# Patient Record
Sex: MALE | Race: WHITE | Employment: OTHER | URBAN - METROPOLITAN AREA
[De-identification: names, ages, dates, MRNs, and addresses within clinical notes are randomized per-mention and may not be internally consistent; named-entity substitution may affect disease eponyms.]

---

## 2021-07-12 ENCOUNTER — HOSPITAL ENCOUNTER (OUTPATIENT)
Age: 81
Setting detail: OBSERVATION
Discharge: HOME | End: 2021-07-14
Attending: EMERGENCY MEDICINE | Admitting: INTERNAL MEDICINE
Payer: MEDICARE

## 2021-07-12 ENCOUNTER — APPOINTMENT (OUTPATIENT)
Dept: GENERAL RADIOLOGY | Age: 81
End: 2021-07-12
Payer: MEDICARE

## 2021-07-12 ENCOUNTER — APPOINTMENT (OUTPATIENT)
Dept: CT IMAGING | Age: 81
End: 2021-07-12
Payer: MEDICARE

## 2021-07-12 DIAGNOSIS — H34.9 RETINAL ARTERY OCCLUSION: Primary | ICD-10-CM

## 2021-07-12 PROBLEM — I63.9 CVA (CEREBRAL VASCULAR ACCIDENT) (HCC): Status: ACTIVE | Noted: 2021-07-12

## 2021-07-12 LAB
ANION GAP SERPL CALCULATED.3IONS-SCNC: 10 MMOL/L (ref 7–16)
APTT: 30 SEC (ref 24.5–35.1)
BASOPHILS ABSOLUTE: 0.06 E9/L (ref 0–0.2)
BASOPHILS RELATIVE PERCENT: 0.8 % (ref 0–2)
BUN BLDV-MCNC: 14 MG/DL (ref 6–23)
CALCIUM SERPL-MCNC: 8.9 MG/DL (ref 8.6–10.2)
CHLORIDE BLD-SCNC: 103 MMOL/L (ref 98–107)
CO2: 25 MMOL/L (ref 22–29)
CREAT SERPL-MCNC: 0.9 MG/DL (ref 0.7–1.2)
EOSINOPHILS ABSOLUTE: 0.38 E9/L (ref 0.05–0.5)
EOSINOPHILS RELATIVE PERCENT: 4.8 % (ref 0–6)
GFR AFRICAN AMERICAN: >60
GFR NON-AFRICAN AMERICAN: >60 ML/MIN/1.73
GLUCOSE BLD-MCNC: 100 MG/DL (ref 74–99)
HCT VFR BLD CALC: 42.4 % (ref 37–54)
HEMOGLOBIN: 14.6 G/DL (ref 12.5–16.5)
IMMATURE GRANULOCYTES #: 0.04 E9/L
IMMATURE GRANULOCYTES %: 0.5 % (ref 0–5)
INR BLD: 1
LYMPHOCYTES ABSOLUTE: 2.06 E9/L (ref 1.5–4)
LYMPHOCYTES RELATIVE PERCENT: 26 % (ref 20–42)
MCH RBC QN AUTO: 33.9 PG (ref 26–35)
MCHC RBC AUTO-ENTMCNC: 34.4 % (ref 32–34.5)
MCV RBC AUTO: 98.4 FL (ref 80–99.9)
MONOCYTES ABSOLUTE: 0.95 E9/L (ref 0.1–0.95)
MONOCYTES RELATIVE PERCENT: 12 % (ref 2–12)
NEUTROPHILS ABSOLUTE: 4.42 E9/L (ref 1.8–7.3)
NEUTROPHILS RELATIVE PERCENT: 55.9 % (ref 43–80)
PDW BLD-RTO: 12.9 FL (ref 11.5–15)
PLATELET # BLD: 234 E9/L (ref 130–450)
PMV BLD AUTO: 9.5 FL (ref 7–12)
POTASSIUM SERPL-SCNC: 4.3 MMOL/L (ref 3.5–5)
PROTHROMBIN TIME: 11.3 SEC (ref 9.3–12.4)
RBC # BLD: 4.31 E12/L (ref 3.8–5.8)
SODIUM BLD-SCNC: 138 MMOL/L (ref 132–146)
TROPONIN, HIGH SENSITIVITY: 10 NG/L (ref 0–11)
TROPONIN, HIGH SENSITIVITY: 9 NG/L (ref 0–11)
WBC # BLD: 7.9 E9/L (ref 4.5–11.5)

## 2021-07-12 PROCEDURE — 85610 PROTHROMBIN TIME: CPT

## 2021-07-12 PROCEDURE — 70450 CT HEAD/BRAIN W/O DYE: CPT

## 2021-07-12 PROCEDURE — 70498 CT ANGIOGRAPHY NECK: CPT

## 2021-07-12 PROCEDURE — 84484 ASSAY OF TROPONIN QUANT: CPT

## 2021-07-12 PROCEDURE — 70496 CT ANGIOGRAPHY HEAD: CPT

## 2021-07-12 PROCEDURE — 71046 X-RAY EXAM CHEST 2 VIEWS: CPT

## 2021-07-12 PROCEDURE — 36415 COLL VENOUS BLD VENIPUNCTURE: CPT

## 2021-07-12 PROCEDURE — G0378 HOSPITAL OBSERVATION PER HR: HCPCS

## 2021-07-12 PROCEDURE — 93005 ELECTROCARDIOGRAM TRACING: CPT | Performed by: NURSE PRACTITIONER

## 2021-07-12 PROCEDURE — 6370000000 HC RX 637 (ALT 250 FOR IP): Performed by: FAMILY MEDICINE

## 2021-07-12 PROCEDURE — 80048 BASIC METABOLIC PNL TOTAL CA: CPT

## 2021-07-12 PROCEDURE — 6360000004 HC RX CONTRAST MEDICATION: Performed by: RADIOLOGY

## 2021-07-12 PROCEDURE — 99283 EMERGENCY DEPT VISIT LOW MDM: CPT

## 2021-07-12 PROCEDURE — 85730 THROMBOPLASTIN TIME PARTIAL: CPT

## 2021-07-12 PROCEDURE — 2580000003 HC RX 258: Performed by: FAMILY MEDICINE

## 2021-07-12 PROCEDURE — 85025 COMPLETE CBC W/AUTO DIFF WBC: CPT

## 2021-07-12 RX ORDER — TROSPIUM CHLORIDE 20 MG/1
20 TABLET, FILM COATED ORAL
Status: DISCONTINUED | OUTPATIENT
Start: 2021-07-13 | End: 2021-07-14 | Stop reason: HOSPADM

## 2021-07-12 RX ORDER — ASPIRIN 81 MG/1
81 TABLET ORAL DAILY
Status: DISCONTINUED | OUTPATIENT
Start: 2021-07-13 | End: 2021-07-14 | Stop reason: HOSPADM

## 2021-07-12 RX ORDER — DOXAZOSIN MESYLATE 4 MG/1
4 TABLET ORAL NIGHTLY
COMMUNITY

## 2021-07-12 RX ORDER — SODIUM CHLORIDE 0.9 % (FLUSH) 0.9 %
5-40 SYRINGE (ML) INJECTION EVERY 12 HOURS SCHEDULED
Status: DISCONTINUED | OUTPATIENT
Start: 2021-07-12 | End: 2021-07-14 | Stop reason: HOSPADM

## 2021-07-12 RX ORDER — TOLTERODINE 4 MG/1
4 CAPSULE, EXTENDED RELEASE ORAL DAILY
COMMUNITY

## 2021-07-12 RX ORDER — ATORVASTATIN CALCIUM 40 MG/1
40 TABLET, FILM COATED ORAL NIGHTLY
Status: DISCONTINUED | OUTPATIENT
Start: 2021-07-12 | End: 2021-07-12 | Stop reason: CLARIF

## 2021-07-12 RX ORDER — DOXAZOSIN MESYLATE 4 MG/1
4 TABLET ORAL NIGHTLY
Status: DISCONTINUED | OUTPATIENT
Start: 2021-07-12 | End: 2021-07-14 | Stop reason: HOSPADM

## 2021-07-12 RX ORDER — LOSARTAN POTASSIUM 50 MG/1
50 TABLET ORAL DAILY
Status: DISCONTINUED | OUTPATIENT
Start: 2021-07-13 | End: 2021-07-14 | Stop reason: HOSPADM

## 2021-07-12 RX ORDER — LOSARTAN POTASSIUM 50 MG/1
50 TABLET ORAL DAILY
COMMUNITY

## 2021-07-12 RX ORDER — ROSUVASTATIN CALCIUM 10 MG/1
5 TABLET, COATED ORAL DAILY
Status: DISCONTINUED | OUTPATIENT
Start: 2021-07-13 | End: 2021-07-14 | Stop reason: HOSPADM

## 2021-07-12 RX ORDER — ONDANSETRON 2 MG/ML
4 INJECTION INTRAMUSCULAR; INTRAVENOUS EVERY 6 HOURS PRN
Status: DISCONTINUED | OUTPATIENT
Start: 2021-07-12 | End: 2021-07-14 | Stop reason: HOSPADM

## 2021-07-12 RX ORDER — SODIUM CHLORIDE 0.9 % (FLUSH) 0.9 %
5-40 SYRINGE (ML) INJECTION PRN
Status: DISCONTINUED | OUTPATIENT
Start: 2021-07-12 | End: 2021-07-14 | Stop reason: HOSPADM

## 2021-07-12 RX ORDER — POLYETHYLENE GLYCOL 3350 17 G/17G
17 POWDER, FOR SOLUTION ORAL DAILY PRN
Status: DISCONTINUED | OUTPATIENT
Start: 2021-07-12 | End: 2021-07-14 | Stop reason: HOSPADM

## 2021-07-12 RX ORDER — ALLOPURINOL 300 MG/1
300 TABLET ORAL DAILY
COMMUNITY

## 2021-07-12 RX ORDER — ALLOPURINOL 300 MG/1
300 TABLET ORAL DAILY
Status: DISCONTINUED | OUTPATIENT
Start: 2021-07-13 | End: 2021-07-14 | Stop reason: HOSPADM

## 2021-07-12 RX ORDER — ROSUVASTATIN CALCIUM 5 MG/1
5 TABLET, COATED ORAL DAILY
COMMUNITY

## 2021-07-12 RX ORDER — SODIUM CHLORIDE 9 MG/ML
25 INJECTION, SOLUTION INTRAVENOUS PRN
Status: DISCONTINUED | OUTPATIENT
Start: 2021-07-12 | End: 2021-07-14 | Stop reason: HOSPADM

## 2021-07-12 RX ORDER — ASPIRIN 300 MG/1
300 SUPPOSITORY RECTAL DAILY
Status: DISCONTINUED | OUTPATIENT
Start: 2021-07-13 | End: 2021-07-14 | Stop reason: HOSPADM

## 2021-07-12 RX ORDER — ONDANSETRON 4 MG/1
4 TABLET, ORALLY DISINTEGRATING ORAL EVERY 8 HOURS PRN
Status: DISCONTINUED | OUTPATIENT
Start: 2021-07-12 | End: 2021-07-14 | Stop reason: HOSPADM

## 2021-07-12 RX ADMIN — DOXAZOSIN 4 MG: 4 TABLET ORAL at 23:28

## 2021-07-12 RX ADMIN — SODIUM CHLORIDE, PRESERVATIVE FREE 10 ML: 5 INJECTION INTRAVENOUS at 23:28

## 2021-07-12 RX ADMIN — IOPAMIDOL 75 ML: 755 INJECTION, SOLUTION INTRAVENOUS at 17:50

## 2021-07-12 ASSESSMENT — ENCOUNTER SYMPTOMS
SORE THROAT: 0
BACK PAIN: 0
SHORTNESS OF BREATH: 0
NAUSEA: 0
EYE PAIN: 0
EYE DISCHARGE: 0
EYE REDNESS: 0
ABDOMINAL PAIN: 0

## 2021-07-12 NOTE — ED NOTES
Visual acuity with patients glasses, both eyes 20/70                                                           Right eye 20/100                                                           Left eye 20/100      Santino Nova LPN  29/02/48 [id number]

## 2021-07-12 NOTE — ED PROVIDER NOTES
80-year-old male with history of hypertension and hyperlipidemia presenting for eye problem. He states he was sent here by Dr. Matthew Suarez after being seen in his office today for blood clot behind his right eye. He states on Saturday evening, he noticed his vision went blurry in his right eye. It has been persistent, worsened by nothing, and relieved by nothing. He denies any pain in his eyes, headache, dizziness, or lightheadedness. He does state he was not feeling well overall on Saturday afternoon, but states by evening he was feeling better. Review of Systems   Constitutional: Negative for chills and fever. HENT: Negative for congestion and sore throat. Eyes: Positive for visual disturbance. Negative for pain, discharge and redness. Respiratory: Negative for shortness of breath. Cardiovascular: Negative for chest pain. Gastrointestinal: Negative for abdominal pain and nausea. Genitourinary: Negative for dysuria and flank pain. Musculoskeletal: Negative for back pain and neck pain. Skin: Negative for rash and wound. Neurological: Negative for dizziness, light-headedness and headaches. Physical Exam  Constitutional:       General: He is not in acute distress. Appearance: He is not ill-appearing or toxic-appearing. HENT:      Head: Normocephalic and atraumatic. Right Ear: External ear normal.      Left Ear: External ear normal.      Nose: Nose normal.   Eyes:      Extraocular Movements: Extraocular movements intact. Conjunctiva/sclera: Conjunctivae normal.      Pupils: Pupils are equal, round, and reactive to light. Comments: Pupils dilated, but equally reactive to light   Cardiovascular:      Rate and Rhythm: Normal rate and regular rhythm. Pulmonary:      Effort: Pulmonary effort is normal.      Breath sounds: Normal breath sounds. Abdominal:      General: There is no distension. Palpations: Abdomen is soft.    Musculoskeletal:         General: No swelling or tenderness. Cervical back: Normal range of motion. Skin:     General: Skin is warm and dry. Neurological:      General: No focal deficit present. Mental Status: He is alert. Sensory: No sensory deficit. Psychiatric:         Mood and Affect: Mood normal.         Thought Content: Thought content normal.          Procedures     MDM  Number of Diagnoses or Management Options  Retinal artery occlusion  Diagnosis management comments: 81-year-old male with a history of hypertension hyperlipidemia sent by eye doctor for retinal artery occlusion. EKG showed first-degree AV block, but was otherwise unremarkable. CTA head and neck did not show any acute process. Chest x-ray showed cardiomegaly, but otherwise unremarkable. Troponin 9, repeat pending. Labs otherwise within normal limits. Patient was admitted for further evaluation.                    --------------------------------------------- PAST HISTORY ---------------------------------------------  Past Medical History:  has a past medical history of Enlarged prostate, Hyperlipidemia, and Hypertension. Past Surgical History:  has a past surgical history that includes hernia repair and Appendectomy. Social History:  reports that he has quit smoking. He has never used smokeless tobacco. He reports previous alcohol use. He reports previous drug use. Family History: family history is not on file. The patients home medications have been reviewed. Allergies: Patient has no known allergies.     -------------------------------------------------- RESULTS -------------------------------------------------    LABS:  Results for orders placed or performed during the hospital encounter of 07/12/21   CBC Auto Differential   Result Value Ref Range    WBC 7.9 4.5 - 11.5 E9/L    RBC 4.31 3.80 - 5.80 E12/L    Hemoglobin 14.6 12.5 - 16.5 g/dL    Hematocrit 42.4 37.0 - 54.0 %    MCV 98.4 80.0 - 99.9 fL    MCH 33.9 26.0 - 35.0 pg    MCHC 34.4 32.0 - 34.5 %    RDW 12.9 11.5 - 15.0 fL    Platelets 474 549 - 080 E9/L    MPV 9.5 7.0 - 12.0 fL    Neutrophils % 55.9 43.0 - 80.0 %    Immature Granulocytes % 0.5 0.0 - 5.0 %    Lymphocytes % 26.0 20.0 - 42.0 %    Monocytes % 12.0 2.0 - 12.0 %    Eosinophils % 4.8 0.0 - 6.0 %    Basophils % 0.8 0.0 - 2.0 %    Neutrophils Absolute 4.42 1.80 - 7.30 E9/L    Immature Granulocytes # 0.04 E9/L    Lymphocytes Absolute 2.06 1.50 - 4.00 E9/L    Monocytes Absolute 0.95 0.10 - 0.95 E9/L    Eosinophils Absolute 0.38 0.05 - 0.50 E9/L    Basophils Absolute 0.06 0.00 - 0.20 E9/L   Troponin   Result Value Ref Range    Troponin, High Sensitivity 9 0 - 11 ng/L   Protime-INR   Result Value Ref Range    Protime 11.3 9.3 - 12.4 sec    INR 1.0    APTT   Result Value Ref Range    aPTT 30.0 24.5 - 35.1 sec   Basic Metabolic Panel   Result Value Ref Range    Sodium 138 132 - 146 mmol/L    Potassium 4.3 3.5 - 5.0 mmol/L    Chloride 103 98 - 107 mmol/L    CO2 25 22 - 29 mmol/L    Anion Gap 10 7 - 16 mmol/L    Glucose 100 (H) 74 - 99 mg/dL    BUN 14 6 - 23 mg/dL    CREATININE 0.9 0.7 - 1.2 mg/dL    GFR Non-African American >60 >=60 mL/min/1.73    GFR African American >60     Calcium 8.9 8.6 - 10.2 mg/dL   EKG 12 Lead   Result Value Ref Range    Ventricular Rate 102 BPM    Atrial Rate 102 BPM    P-R Interval 230 ms    QRS Duration 90 ms    Q-T Interval 318 ms    QTc Calculation (Bazett) 414 ms    P Axis 28 degrees    R Axis -23 degrees    T Axis 12 degrees       RADIOLOGY:  CT HEAD WO CONTRAST   Final Result   No acute intracranial abnormality. CTA HEAD W CONTRAST   Final Result   1. No significant stenosis or occlusion of the cervical vasculature. 2. No large vessel occlusion intracranially. 3. Heterogeneous thyroid with multiple nodules measuring up to 1.8 cm. RECOMMENDATIONS:   1.8 cm incidental thyroid nodule. Recommend thyroid US. Reference: J Am Tanvir Radiol.  2015 Feb;12(2): 143-50         CTA NECK W CONTRAST Final Result   1. No significant stenosis or occlusion of the cervical vasculature. 2. No large vessel occlusion intracranially. 3. Heterogeneous thyroid with multiple nodules measuring up to 1.8 cm. RECOMMENDATIONS:   1.8 cm incidental thyroid nodule. Recommend thyroid US. Reference: J Am Tanvir Radiol. 2015 Feb;12(2): 143-50         XR CHEST (2 VW)   Final Result   1. There are no findings of failure or pneumonia   2. Cardiomegaly. EKG:  This EKG is signed and interpreted by me. Rate: 102  Rhythm: Sinus  Interpretation: sinus tachycardia and 1st degree AV block  Comparison: no previous EKG available      ------------------------- NURSING NOTES AND VITALS REVIEWED ---------------------------  Date / Time Roomed:  7/12/2021  3:14 PM  ED Bed Assignment:  36/36    The nursing notes within the ED encounter and vital signs as below have been reviewed. Patient Vitals for the past 24 hrs:   BP Temp Pulse Resp SpO2 Height Weight   07/12/21 1433 (!) 144/104   18  5' 11\" (1.803 m) 225 lb (102.1 kg)   07/12/21 1422  97.5 °F (36.4 °C) 110  96 %         Oxygen Saturation Interpretation: Normal    ------------------------------------------ PROGRESS NOTES ------------------------------------------      Counseling:  I have spoken with the patient and discussed todays results, in addition to providing specific details for the plan of care and counseling regarding the diagnosis and prognosis. Their questions are answered at this time and they are agreeable with the plan of admission.    --------------------------------- ADDITIONAL PROVIDER NOTES ---------------------------------    This patient's ED course included: a personal history and physicial examination, re-evaluation prior to disposition, multiple bedside re-evaluations, cardiac monitoring and continuous pulse oximetry    This patient has remained hemodynamically stable during their ED course. Diagnosis:  1.  Retinal artery occlusion Disposition:  Patient's disposition: Admit to telemetry  Patient's condition is stable.          Kianna Gooden MD  Resident  07/12/21 6157

## 2021-07-12 NOTE — ED NOTES
Bed: 36  Expected date:   Expected time:   Means of arrival:   Comments:  Chyna York RN  07/12/21 7503

## 2021-07-12 NOTE — ED NOTES
FIRST PROVIDER CONTACT ASSESSMENT NOTE        Department of Emergency Medicine            ED  First Provider Note            7/12/21  2:38 PM EDT    Chief Complaint: Eye Problem (sent in by Dr. Florentino Jacobs for eye problems)      History of Present Illness:    Sarita Valdez is a [de-identified] y.o. male who presents to the emergency department for retinal artery occlusion  Focused Screening Exam:  Constitutional:  Alert, appears stated age and is in no distress. *ALLERGIES*     Patient has no known allergies.      ED Triage Vitals   BP Temp Temp src Pulse Resp SpO2 Height Weight   07/12/21 1433 07/12/21 1422 -- 07/12/21 1422 07/12/21 1433 07/12/21 1422 07/12/21 1433 07/12/21 1433   (!) 144/104 97.5 °F (36.4 °C)  110 18 96 % 5' 11\" (1.803 m) 225 lb (102.1 kg)        Initial Plan of Care:  Initiate Treatment-Testing, Proceed toTreatment Area When Bed Available for ED Attending/MLP to Continue Care    -----------------END OF FIRST PROVIDER CONTACT ASSESSMENT NOTE--------------  Electronically signed by FAITH Lincoln CNP   DD: 7/12/21       FAITH Lincoln CNP  07/12/21 1430

## 2021-07-13 ENCOUNTER — APPOINTMENT (OUTPATIENT)
Dept: MRI IMAGING | Age: 81
End: 2021-07-13
Payer: MEDICARE

## 2021-07-13 LAB
ANION GAP SERPL CALCULATED.3IONS-SCNC: 7 MMOL/L (ref 7–16)
BUN BLDV-MCNC: 15 MG/DL (ref 6–23)
C-REACTIVE PROTEIN: 0.3 MG/DL (ref 0–0.4)
CALCIUM SERPL-MCNC: 8.7 MG/DL (ref 8.6–10.2)
CHLORIDE BLD-SCNC: 103 MMOL/L (ref 98–107)
CHOLESTEROL, TOTAL: 122 MG/DL (ref 0–199)
CO2: 27 MMOL/L (ref 22–29)
CREAT SERPL-MCNC: 0.9 MG/DL (ref 0.7–1.2)
EKG ATRIAL RATE: 102 BPM
EKG P AXIS: 28 DEGREES
EKG P-R INTERVAL: 230 MS
EKG Q-T INTERVAL: 318 MS
EKG QRS DURATION: 90 MS
EKG QTC CALCULATION (BAZETT): 414 MS
EKG R AXIS: -23 DEGREES
EKG T AXIS: 12 DEGREES
EKG VENTRICULAR RATE: 102 BPM
GFR AFRICAN AMERICAN: >60
GFR NON-AFRICAN AMERICAN: >60 ML/MIN/1.73
GLUCOSE BLD-MCNC: 94 MG/DL (ref 74–99)
HBA1C MFR BLD: 5.2 % (ref 4–5.6)
HCT VFR BLD CALC: 40.1 % (ref 37–54)
HDLC SERPL-MCNC: 46 MG/DL
HEMOGLOBIN: 13.9 G/DL (ref 12.5–16.5)
LDL CHOLESTEROL CALCULATED: 60 MG/DL (ref 0–99)
MCH RBC QN AUTO: 33.8 PG (ref 26–35)
MCHC RBC AUTO-ENTMCNC: 34.7 % (ref 32–34.5)
MCV RBC AUTO: 97.6 FL (ref 80–99.9)
PDW BLD-RTO: 12.8 FL (ref 11.5–15)
PLATELET # BLD: 228 E9/L (ref 130–450)
PMV BLD AUTO: 9.2 FL (ref 7–12)
POTASSIUM REFLEX MAGNESIUM: 4.1 MMOL/L (ref 3.5–5)
RBC # BLD: 4.11 E12/L (ref 3.8–5.8)
SEDIMENTATION RATE, ERYTHROCYTE: 5 MM/HR (ref 0–15)
SODIUM BLD-SCNC: 137 MMOL/L (ref 132–146)
TRIGL SERPL-MCNC: 80 MG/DL (ref 0–149)
VLDLC SERPL CALC-MCNC: 16 MG/DL
WBC # BLD: 6.6 E9/L (ref 4.5–11.5)

## 2021-07-13 PROCEDURE — 93010 ELECTROCARDIOGRAM REPORT: CPT | Performed by: INTERNAL MEDICINE

## 2021-07-13 PROCEDURE — 86140 C-REACTIVE PROTEIN: CPT

## 2021-07-13 PROCEDURE — 83036 HEMOGLOBIN GLYCOSYLATED A1C: CPT

## 2021-07-13 PROCEDURE — 85651 RBC SED RATE NONAUTOMATED: CPT

## 2021-07-13 PROCEDURE — 6370000000 HC RX 637 (ALT 250 FOR IP): Performed by: FAMILY MEDICINE

## 2021-07-13 PROCEDURE — 70551 MRI BRAIN STEM W/O DYE: CPT

## 2021-07-13 PROCEDURE — 6370000000 HC RX 637 (ALT 250 FOR IP): Performed by: NURSE PRACTITIONER

## 2021-07-13 PROCEDURE — 6370000000 HC RX 637 (ALT 250 FOR IP): Performed by: INTERNAL MEDICINE

## 2021-07-13 PROCEDURE — 85027 COMPLETE CBC AUTOMATED: CPT

## 2021-07-13 PROCEDURE — 6360000002 HC RX W HCPCS: Performed by: FAMILY MEDICINE

## 2021-07-13 PROCEDURE — 2580000003 HC RX 258: Performed by: FAMILY MEDICINE

## 2021-07-13 PROCEDURE — 96372 THER/PROPH/DIAG INJ SC/IM: CPT

## 2021-07-13 PROCEDURE — 36415 COLL VENOUS BLD VENIPUNCTURE: CPT

## 2021-07-13 PROCEDURE — 80048 BASIC METABOLIC PNL TOTAL CA: CPT

## 2021-07-13 PROCEDURE — G0378 HOSPITAL OBSERVATION PER HR: HCPCS

## 2021-07-13 PROCEDURE — 80061 LIPID PANEL: CPT

## 2021-07-13 PROCEDURE — 99221 1ST HOSP IP/OBS SF/LOW 40: CPT | Performed by: PSYCHIATRY & NEUROLOGY

## 2021-07-13 RX ORDER — LORAZEPAM 0.5 MG/1
0.5 TABLET ORAL
Status: COMPLETED | OUTPATIENT
Start: 2021-07-13 | End: 2021-07-13

## 2021-07-13 RX ORDER — ACETAMINOPHEN 325 MG/1
650 TABLET ORAL ONCE
Status: COMPLETED | OUTPATIENT
Start: 2021-07-13 | End: 2021-07-13

## 2021-07-13 RX ADMIN — ENOXAPARIN SODIUM 40 MG: 40 INJECTION SUBCUTANEOUS at 08:26

## 2021-07-13 RX ADMIN — ALLOPURINOL 300 MG: 300 TABLET ORAL at 08:26

## 2021-07-13 RX ADMIN — ACETAMINOPHEN 650 MG: 325 TABLET ORAL at 00:20

## 2021-07-13 RX ADMIN — ASPIRIN 81 MG: 81 TABLET, COATED ORAL at 08:26

## 2021-07-13 RX ADMIN — LOSARTAN POTASSIUM 50 MG: 50 TABLET, FILM COATED ORAL at 08:26

## 2021-07-13 RX ADMIN — LORAZEPAM 0.5 MG: 0.5 TABLET ORAL at 16:42

## 2021-07-13 RX ADMIN — SODIUM CHLORIDE, PRESERVATIVE FREE 10 ML: 5 INJECTION INTRAVENOUS at 08:28

## 2021-07-13 RX ADMIN — DOXAZOSIN 4 MG: 4 TABLET ORAL at 20:30

## 2021-07-13 RX ADMIN — ROSUVASTATIN 5 MG: 10 TABLET, FILM COATED ORAL at 08:26

## 2021-07-13 RX ADMIN — TROSPIUM CHLORIDE 20 MG: 20 TABLET, FILM COATED ORAL at 06:50

## 2021-07-13 RX ADMIN — SODIUM CHLORIDE, PRESERVATIVE FREE 10 ML: 5 INJECTION INTRAVENOUS at 20:30

## 2021-07-13 RX ADMIN — TROSPIUM CHLORIDE 20 MG: 20 TABLET, FILM COATED ORAL at 15:47

## 2021-07-13 ASSESSMENT — VISUAL ACUITY: METHOD_CF: 1

## 2021-07-13 ASSESSMENT — ENCOUNTER SYMPTOMS
SHORTNESS OF BREATH: 0
EYE ITCHING: 0
FACIAL SWELLING: 0
ABDOMINAL PAIN: 0
ABDOMINAL DISTENTION: 0
EYE REDNESS: 0
SORE THROAT: 0
SINUS PAIN: 0
COLOR CHANGE: 0
EYE DISCHARGE: 0
CHEST TIGHTNESS: 0
EYE PAIN: 0

## 2021-07-13 ASSESSMENT — PAIN SCALES - GENERAL
PAINLEVEL_OUTOF10: 3
PAINLEVEL_OUTOF10: 0

## 2021-07-13 NOTE — PROGRESS NOTES
Date:2021  Patient Name: Emeterio Whittington  MRN: [id number]  : 1940  ROOM #: [id number]    Occupational Therapy Screen    OT orders received and chart reviewed. OT screen completed as pt politely declines need for skilled therapy. Pt states no concerns for skilled OT services at this time during admission or upon return to home. OT observed Pt independent in room/bathroom. OT will discontinue orders at this time. Please re-order OT if there is a change in physical status and OT is needed.      Thank you,    Stacie Del Valle, [address] OTR/L #[id number]

## 2021-07-13 NOTE — CONSULTS
Rannie Krabbe is a [de-identified] y.o. male with CC of vision loss R eye. Chief Complaint   Patient presents with   [facility] Problem     sent in by Dr. Ora Dover for eye problems - retinal artery occlusion       HPI: Pt was doing well until Saturday evening when he noticed he had a blurry spot in his right eye. He is unsure if this happened over minutes or hours. He describes this being mainly in the superior visual field with some wrap around to the bottom of the eye, sparing the central portion of his vision. His retinal specialist performed a dilated fundoscopic exam yesterday and told the patient his retinal artery was blocked. The patient has chronic macular degeneration in the left eye but notes no acute change in vision in the left eye. Pt denies weakness, numbness, facial droop, jaw soreness or scalp tenderness, new/change in headache (history of tension headaches, which he was having on Saturday). Denies fever, chills. He is planning on moving to [place] in October and feels fatigued from the prep work around the house. HPI  Prior to Visit Medications    Medication Sig Taking? Authorizing Provider   tolterodine (DETROL LA) 4 MG extended release capsule Take 4 mg by mouth daily Yes Historical Provider, MD   rosuvastatin (CRESTOR) 5 MG tablet Take 5 mg by mouth daily Yes Historical Provider, MD   losartan (COZAAR) 50 MG tablet Take 50 mg by mouth daily Yes Historical Provider, MD   doxazosin (CARDURA) 4 MG tablet Take 4 mg by mouth nightly Yes Historical Provider, MD   allopurinol (ZYLOPRIM) 300 MG tablet Take 300 mg by mouth daily Yes Historical Provider, MD     Social History     Tobacco Use    Smoking status: Former Smoker    Smokeless tobacco: Never Used   Substance Use Topics    Alcohol use: Not Currently    Drug use: Not Currently     History reviewed. No pertinent family history.   Past Surgical History:   Procedure Laterality Date    APPENDECTOMY      HERNIA REPAIR       Past Medical History:   Diagnosis Date    Enlarged prostate     Hyperlipidemia     Hypertension      Review of Systems   Constitutional: Negative for activity change, appetite change, chills, diaphoresis and fatigue. HENT: Negative for ear pain, facial swelling, hearing loss, sinus pain and sore throat. Eyes: Positive for visual disturbance. Negative for pain, discharge, redness and itching. Respiratory: Negative for chest tightness and shortness of breath. Cardiovascular: Negative for chest pain. Gastrointestinal: Negative for abdominal distention and abdominal pain. Musculoskeletal: Negative for arthralgias, gait problem and neck stiffness. Skin: Negative for color change and rash. Neurological: Positive for headaches. Negative for dizziness, tremors, seizures, syncope, facial asymmetry, speech difficulty, weakness, light-headedness and numbness. Psychiatric/Behavioral: Negative for confusion, decreased concentration and dysphoric mood. Objective:   BP (!) 130/95   Pulse 73   Temp 97.7 °F (36.5 °C) (Temporal)   Resp 16   Ht 5' 11\" (1.803 m)   Wt 225 lb (102.1 kg)   SpO2 95%   BMI 31.38 kg/m²     Physical Exam  Constitutional:       General: He is awake. He is not in acute distress. Appearance: Normal appearance. He is obese. He is not ill-appearing, toxic-appearing or diaphoretic. HENT:      Head: Normocephalic and atraumatic. Nose: No congestion or rhinorrhea. Mouth/Throat:      Mouth: Mucous membranes are moist.      Pharynx: No oropharyngeal exudate or posterior oropharyngeal erythema. Eyes:      General: No scleral icterus. Extraocular Movements: Extraocular movements intact. Conjunctiva/sclera: Conjunctivae normal.   Cardiovascular:      Rate and Rhythm: Normal rate. Pulses: Normal pulses. Pulmonary:      Effort: Pulmonary effort is normal.   Abdominal:      General: Abdomen is flat. Musculoskeletal:         General: No swelling or tenderness.  Normal range of motion. Cervical back: Normal range of motion and neck supple. No rigidity or tenderness. Skin:     General: Skin is warm and dry. Neurological:      Mental Status: He is alert and oriented to person, place, and time. Mental status is at baseline. Sensory: No sensory deficit. Motor: No weakness. Coordination: Coordination normal.      Gait: Gait normal.      Deep Tendon Reflexes: Strength normal and reflexes are normal and symmetric. Reflexes normal.   Psychiatric:         Mood and Affect: Mood normal.         Speech: Speech normal.         Thought Content: Thought content normal.                 Neurological Exam  Mental Status  Awake and alert. Oriented to person, place, time and situation. Recent and remote memory are intact. Speech is normal. Language is fluent with no aphasia. Attention and concentration are normal. Fund of knowledge is appropriate for level of education. Apraxia absent. Cranial Nerves  CN II: Tested with correction. Right visual acuity: counts fingers. Left visual acuity: counts fingers. Right monocular defect. Left normal visual field. Right funduscopic exam: not visualized. Left funduscopic exam: not visualized. CN III, IV, VI: Extraocular movements intact bilaterally. Right pupil: 3 mm. Round. Abnormal reactivity:   Left pupil: 3 mm. Round. Reactive to light. CN V: Facial sensation is normal.  CN VII: Full and symmetric facial movement. CN VIII: Hearing is normal.  CN IX, X: Palate elevates symmetrically  CN XI: Shoulder shrug strength is normal.  CN XII: Tongue midline without atrophy or fasciculations. Right eye sluggish reaction to light, very slight. Motor  Normal muscle bulk throughout. No fasciculations present. Normal muscle tone. No abnormal involuntary movements. Strength is 5/5 throughout all four extremities. Sensory  Sensation is intact to light touch, pinprick, vibration and proprioception in all four extremities.     Reflexes  Deep tendon reflexes are 2+ and symmetric in all four extremities with downgoing toes bilaterally. Coordination  Right: Finger-to-nose normal. Rapid alternating movement normal.  Left: Finger-to-nose normal. Rapid alternating movement normal.    Gait  Not tested. Laboratory/Radiology:     CBC with Differential:    Lab Results   Component Value Date    WBC 6.6 07/13/2021    RBC 4.11 07/13/2021    HGB 13.9 07/13/2021    HCT 40.1 07/13/2021     07/13/2021    MCV 97.6 07/13/2021    MCH 33.8 07/13/2021    MCHC 34.7 07/13/2021    RDW 12.8 07/13/2021    LYMPHOPCT 26.0 07/12/2021    MONOPCT 12.0 07/12/2021    BASOPCT 0.8 07/12/2021    MONOSABS 0.95 07/12/2021    LYMPHSABS 2.06 07/12/2021    EOSABS 0.38 07/12/2021    BASOSABS 0.06 07/12/2021     CMP:    Lab Results   Component Value Date     07/13/2021    K 4.1 07/13/2021     07/13/2021    CO2 27 07/13/2021    BUN 15 07/13/2021    CREATININE 0.9 07/13/2021    GFRAA >60 07/13/2021    LABGLOM >60 07/13/2021    GLUCOSE 94 07/13/2021    CALCIUM 8.7 07/13/2021     HgBA1c:    Lab Results   Component Value Date    LABA1C 5.2 07/13/2021     Lab Results   Component Value Date    INR 1.0 07/12/2021    PROTIME 11.3 07/12/2021     Lab Results   Component Value Date    CHOL 122 07/13/2021     Lab Results   Component Value Date    TRIG 80 07/13/2021     Lab Results   Component Value Date    HDL 46 07/13/2021     Lab Results   Component Value Date    LDLCALC 60 07/13/2021     Lab Results   Component Value Date    LABVLDL 16 07/13/2021       CT head wo contrast 07/12/2021:  No acute intracranial abnormality. CTA head/neck with contrast:     1. No significant stenosis or occlusion of the cervical vasculature. 2. No large vessel occlusion intracranially. 3. Heterogeneous thyroid with multiple nodules measuring up to 1.8 cm.       RECOMMENDATIONS:   1.8 cm incidental thyroid nodule. Recommend thyroid US.        MRI brain:  pending    Echo w bubble:  pending      I independently reviewed the labs and imaging studies at today's appointment. Assessment:   Pt had visual field loss that he says spares the central portion of his vision but on exam visual acuity is 20/100 in the right eye. Agree with retinal specialist that this probably represents retinal artery occlusion. GCA is less likely given lack of new or changing headache, but not impossible. Retinal detachment, acute angle closure glaucoma less likely given history and physical exam findings. Plan:   1. ESR/CRP R/o GCA  2. ECHO, MRI pending. 2. ASA 81mg daily. Pt will continue home statin. Titrate BP meds to ideally 130/80. 3. Encouraged daily exercise and healthy eating. Kya Armstrong  11:29 AM  7/13/2021    I personally performed history and physical. Reviewed relevant historical and clinical data and images. Note written in conjunction with medical student and edited to reflect my findings and clinical reasoning. CRAO. No significant carotid stenosis. Not c/w GCA. Aspirin stantin. 21 days of plavix additionally. Ok for discharge from my standpoint.

## 2021-07-13 NOTE — CARE COORDINATION
Met with patient at bedside. Lives in a home with significant other, Ameena Rodrigez, 4 steps to enter. Patient independent with ADLs and iADLs, ambulates without a device, no DME and no previous home care. PCP is Dr. Alpesh Adams. Receives meds at [facility]). Patient plans to discharge home, no needs and family to transport.     Andressa Valenzuela, MSW, LSW [phone]

## 2021-07-13 NOTE — H&P
[address] [facility]  History and Physical      CHIEF COMPLAINT: Sent in by eye doctor       Patient of Candida Mohamud DO presents with:  Retinal artery occlusion    History of Present Illness:   Patient is an 81-year-old male with a past medical history of hyperlipidemia and hypertension. Patient presented to the ED at the advisement of his eye doctor due to a retinal artery occlusion. Patient states on Saturday he was watching TV and his right eye became blurred honeycomb type with patches of fogginess. Patient admits its only in the right eye and his left eye is normal.  Patient states this is never happened in the past and there are no aggravating factors and there are no relieving factors. Patient denies any chest pain, shortness of breath, fever, chills, headache, dizziness, and abdominal pain. Patient admits he is compliant with his medications and he follows with his PCP every 3 to 6 months. Patient was admitted observation for further testing and treatment. REVIEW OF SYSTEMS:  Pertinent negatives are above in HPI. 10 point ROS otherwise negative. Past Medical History:   Diagnosis Date    Enlarged prostate     Hyperlipidemia     Hypertension          Past Surgical History:   Procedure Laterality Date    APPENDECTOMY      HERNIA REPAIR         Medications Prior to Admission:    Medications Prior to Admission: tolterodine (DETROL LA) 4 MG extended release capsule, Take 4 mg by mouth daily  rosuvastatin (CRESTOR) 5 MG tablet, Take 5 mg by mouth daily  losartan (COZAAR) 50 MG tablet, Take 50 mg by mouth daily  doxazosin (CARDURA) 4 MG tablet, Take 4 mg by mouth nightly  allopurinol (ZYLOPRIM) 300 MG tablet, Take 300 mg by mouth daily    Note that the patient's home medications were reviewed and the above list is accurate to the best of my knowledge at the time of the exam.    Allergies:    Patient has no known allergies.     Social History:    reports that he has quit significant stenosis or occlusion of the cervical vasculature. No large vessel occlusion intracranially. Heterogenous thyroid with multiple nodules measuring up to 1.8 cm    EKG:  Sinus tach with first-degree AV block    Telemetry:  I've personally reviewed the patient's telemetry:      ASSESSMENT/PLAN:  Principal Problem:    Retinal artery occlusion  Active Problems:    CVA (cerebral vascular accident) ([address]  Resolved Problems:    * No resolved hospital problems. *    71-year-old male with a past medical history of hypertension, and hyperlipidemia admitted to telemetry unit with     Retinal artery occlusion/CVA  -MRI brain without contrast  -Aspirin 81 mg   -Check lipid panel and hemoglobin A1c needs tight control of risk factors. -Monitor blood pressure-adjust medications as needed.  -Discussed risk factor modification.  -Echocardiogram pending    Medication for other comorbidities continue as appropriate dose adjustment as necessary. DVT prophylaxis  PT OT  Discharge planning  Case discussed with attending and agreed upon plan of care. Code status: Full  Requires inpatient level of care  FAITH Nash - CNP    7:05 AM  7/78/7566     Embolic work-up for central artery occlusion  Await neurology evaluation  Echo, MRI brain, vascular studies of neck  Normal lipid panel and A1c  Hopeful for discharge later today  Will need 30-day event monitor to assess for A. fib as outpatient at the time of discharge    I personally saw, examined and provided care for the patient. Radiographs, labs and medication list were reviewed by me independently. The case was discussed in detail and plans for care were established. Review of [address] [place] [address], documentation was conducted and revisions were made as appropriate directly by me. I agree with the above documented exam, problem list, and plan of care.      Bob Renteria MD  10:03 AM  7/13/2021

## 2021-07-14 VITALS
SYSTOLIC BLOOD PRESSURE: 103 MMHG | WEIGHT: 225 LBS | HEIGHT: 71 IN | BODY MASS INDEX: 31.5 KG/M2 | DIASTOLIC BLOOD PRESSURE: 72 MMHG | TEMPERATURE: 98 F | OXYGEN SATURATION: 95 % | HEART RATE: 105 BPM | RESPIRATION RATE: 20 BRPM

## 2021-07-14 LAB
LV EF: 63 %
LVEF MODALITY: NORMAL

## 2021-07-14 PROCEDURE — 6370000000 HC RX 637 (ALT 250 FOR IP): Performed by: FAMILY MEDICINE

## 2021-07-14 PROCEDURE — 93306 TTE W/DOPPLER COMPLETE: CPT

## 2021-07-14 PROCEDURE — G0378 HOSPITAL OBSERVATION PER HR: HCPCS

## 2021-07-14 PROCEDURE — 96372 THER/PROPH/DIAG INJ SC/IM: CPT

## 2021-07-14 PROCEDURE — 2580000003 HC RX 258: Performed by: FAMILY MEDICINE

## 2021-07-14 PROCEDURE — 6360000002 HC RX W HCPCS: Performed by: FAMILY MEDICINE

## 2021-07-14 PROCEDURE — 6370000000 HC RX 637 (ALT 250 FOR IP): Performed by: PSYCHIATRY & NEUROLOGY

## 2021-07-14 RX ORDER — CLOPIDOGREL BISULFATE 75 MG/1
75 TABLET ORAL DAILY
Qty: 21 TABLET | Refills: 0 | Status: SHIPPED | OUTPATIENT
Start: 2021-07-15 | End: 2021-08-05

## 2021-07-14 RX ORDER — CLOPIDOGREL BISULFATE 75 MG/1
75 TABLET ORAL DAILY
Status: DISCONTINUED | OUTPATIENT
Start: 2021-07-14 | End: 2021-07-14 | Stop reason: HOSPADM

## 2021-07-14 RX ORDER — ASPIRIN 81 MG/1
81 TABLET ORAL DAILY
Qty: 30 TABLET | Refills: 3 | Status: SHIPPED | OUTPATIENT
Start: 2021-07-15

## 2021-07-14 RX ADMIN — ASPIRIN 81 MG: 81 TABLET, COATED ORAL at 09:16

## 2021-07-14 RX ADMIN — TROSPIUM CHLORIDE 20 MG: 20 TABLET, FILM COATED ORAL at 06:53

## 2021-07-14 RX ADMIN — CLOPIDOGREL BISULFATE 75 MG: 75 TABLET ORAL at 09:18

## 2021-07-14 RX ADMIN — ALLOPURINOL 300 MG: 300 TABLET ORAL at 09:16

## 2021-07-14 RX ADMIN — SODIUM CHLORIDE, PRESERVATIVE FREE 10 ML: 5 INJECTION INTRAVENOUS at 09:16

## 2021-07-14 RX ADMIN — ROSUVASTATIN 5 MG: 10 TABLET, FILM COATED ORAL at 09:16

## 2021-07-14 RX ADMIN — LOSARTAN POTASSIUM 50 MG: 50 TABLET, FILM COATED ORAL at 09:16

## 2021-07-14 RX ADMIN — ENOXAPARIN SODIUM 40 MG: 40 INJECTION SUBCUTANEOUS at 09:16

## 2021-07-14 ASSESSMENT — PAIN SCALES - GENERAL: PAINLEVEL_OUTOF10: 0

## 2021-07-14 NOTE — PROGRESS NOTES
Went back to do definity on patient since I forgot to do while there in am but pt discharged and no iv access available [facility]

## 2021-07-14 NOTE — PROGRESS NOTES
Physical Therapy    PT orders received and chart reviewed to attempt eval. Pt politely declines need for skilled therapy eval stating he has been amb independently and has no concerns regarding mobility during admission or upon d/c. PT will discontinue at this time. Thank you.     Iraida Ohara, PT, DPT  [id number]

## 2021-07-14 NOTE — DISCHARGE SUMMARY
Physician Discharge Summary     Patient ID:  Emani Cruz  [id number]  82 y.o.  1940    Admit date: 7/12/2021    Discharge date and time: 7/14/2021    Admission Diagnoses:   Patient Active Problem List   Diagnosis    Retinal artery occlusion    CVA (cerebral vascular accident) [facility])       Discharge Diagnoses: Central retinal artery occlusion, no evidence of stroke on MRI, negative embolic work-up    Consults: Neurology    Procedures: Imaging studies    Hospital Course:   Referred to the hospital by ophthalmology for central retinal artery occlusion  Embolic work-up for central artery occlusionnegative  Await neurology evaluationaspirin indefinitely and Plavix x21 days, nystatin  Echo, MRI brain, vascular studies of necknegative  Normal lipid panel and A1c  Will need 30-day event monitor to assess for A. fib as outpatient at the time of discharge  Okay for discharge from medicine standpoint    Recent Labs     07/12/21  1503 07/13/21  0645   WBC 7.9 6.6   HGB 14.6 13.9   HCT 42.4 40.1    228       Recent Labs     07/12/21  1503 07/13/21  0645    137   K 4.3 4.1    103   CO2 25 27   BUN 14 15   CREATININE 0.9 0.9   CALCIUM 8.9 8.7       XR CHEST (2 VW)    Result Date: 7/12/2021  EXAMINATION: TWO XRAY VIEWS OF THE CHEST 7/12/2021 4:29 pm COMPARISON: None. HISTORY: ORDERING SYSTEM PROVIDED HISTORY: emergency TECHNOLOGIST PROVIDED HISTORY: If in ER room at the time of exam, OK to change to portable 1 view Reason for exam:->emergency What reading provider will be dictating this exam?->CRC FINDINGS: The cardiac silhouette is enlarged. No findings of failure. There is no right or left lung infiltrate. There is no pleural effusion. 1. There are no findings of failure or pneumonia 2. Cardiomegaly.      CT HEAD WO CONTRAST    Result Date: 7/12/2021  EXAMINATION: CT OF THE HEAD WITHOUT CONTRAST  7/12/2021 5:32 pm TECHNIQUE: CT of the head was performed without the administration of intravenous contrast. Dose modulation, iterative reconstruction, and/or weight based adjustment of the mA/kV was utilized to reduce the radiation dose to as low as reasonably achievable. COMPARISON: None. HISTORY: ORDERING SYSTEM PROVIDED HISTORY: retinal artery occlusion TECHNOLOGIST PROVIDED HISTORY: Has a \"code stroke\" or \"stroke alert\" been called? ->No Reason for exam:->retinal artery occlusion Decision Support Exception - unselect if not a suspected or confirmed emergency medical condition->Emergency Medical Condition (MA) What reading provider will be dictating this exam?->CRC FINDINGS: BRAIN/VENTRICLES: There is no acute intracranial hemorrhage, mass effect or midline shift. No abnormal extra-axial fluid collection. The gray-white differentiation is maintained without evidence of an acute infarct. There is no evidence of hydrocephalus. ORBITS: The visualized portion of the orbits demonstrate no acute abnormality. SINUSES: There are mild ethmoid and frontal sinus mucosal thickening. SOFT TISSUES/SKULL:  No acute abnormality of the visualized skull or soft tissues. No acute intracranial abnormality. CTA NECK W CONTRAST    Result Date: 7/12/2021  EXAMINATION: CTA OF THE HEAD WITH CONTRAST; CTA OF THE NECK 7/12/2021 5:32 pm: TECHNIQUE: CTA of the head/brain was performed with the administration of intravenous contrast. Multiplanar reformatted images are provided for review. MIP images are provided for review. Dose modulation, iterative reconstruction, and/or weight based adjustment of the mA/kV was utilized to reduce the radiation dose to as low as reasonably achievable.; CTA of the neck was performed with the administration of intravenous contrast. Multiplanar reformatted images are provided for review. MIP images are provided for review. Stenosis of the internal carotid arteries measured using NASCET criteria.  Dose modulation, iterative reconstruction, and/or weight based adjustment of the mA/kV was utilized to reduce the radiation dose to as low as reasonably achievable. COMPARISON: None. HISTORY: ORDERING SYSTEM PROVIDED HISTORY: retinal artery occlusion TECHNOLOGIST PROVIDED HISTORY: Has a \"code stroke\" or \"stroke alert\" been called? ->No Reason for exam:->retinal artery occlusion Decision Support Exception - unselect if not a suspected or confirmed emergency medical condition->Emergency Medical Condition (MA) What reading provider will be dictating this exam?->CRC FINDINGS: CTA NECK: AORTIC ARCH/ARCH VESSELS: No dissection or arterial injury. No significant stenosis of the brachiocephalic or subclavian arteries. CAROTID ARTERIES: No dissection, arterial injury, or hemodynamically significant stenosis by NASCET criteria. VERTEBRAL ARTERIES: No dissection, arterial injury, or significant stenosis. SOFT TISSUES: The lung apices are clear. No cervical or superior mediastinal lymphadenopathy. The larynx and pharynx are unremarkable. The thyroid is diffusely heterogeneous with multiple thyroid nodules measuring up to 1.8 cm. BONES: Multilevel degenerative changes in the cervical spine. CTA HEAD: ANTERIOR CIRCULATION: No significant stenosis of the intracranial internal carotid, anterior cerebral, or middle cerebral arteries. No aneurysm. POSTERIOR CIRCULATION: No significant stenosis of the vertebral, basilar, or posterior cerebral arteries. No aneurysm. OTHER: No dural venous sinus thrombosis on this non-dedicated study. BRAIN: No mass effect or midline shift. No extra-axial fluid collection. The gray-white differentiation is maintained. Scattered mucosal thickening in the paranasal sinuses. 1. No significant stenosis or occlusion of the cervical vasculature. 2. No large vessel occlusion intracranially. 3. Heterogeneous thyroid with multiple nodules measuring up to 1.8 cm. RECOMMENDATIONS: 1.8 cm incidental thyroid nodule. Recommend thyroid US. Reference: J Am Tanvir Radiol.  2015 Feb;12(2): 143-50     CTA HEAD W CONTRAST    Result Date: 7/12/2021  EXAMINATION: CTA OF THE HEAD WITH CONTRAST; CTA OF THE NECK 7/12/2021 5:32 pm: TECHNIQUE: CTA of the head/brain was performed with the administration of intravenous contrast. Multiplanar reformatted images are provided for review. MIP images are provided for review. Dose modulation, iterative reconstruction, and/or weight based adjustment of the mA/kV was utilized to reduce the radiation dose to as low as reasonably achievable.; CTA of the neck was performed with the administration of intravenous contrast. Multiplanar reformatted images are provided for review. MIP images are provided for review. Stenosis of the internal carotid arteries measured using NASCET criteria. Dose modulation, iterative reconstruction, and/or weight based adjustment of the mA/kV was utilized to reduce the radiation dose to as low as reasonably achievable. COMPARISON: None. HISTORY: ORDERING SYSTEM PROVIDED HISTORY: retinal artery occlusion TECHNOLOGIST PROVIDED HISTORY: Has a \"code stroke\" or \"stroke alert\" been called? ->No Reason for exam:->retinal artery occlusion Decision Support Exception - unselect if not a suspected or confirmed emergency medical condition->Emergency Medical Condition (MA) What reading provider will be dictating this exam?->CRC FINDINGS: CTA NECK: AORTIC ARCH/ARCH VESSELS: No dissection or arterial injury. No significant stenosis of the brachiocephalic or subclavian arteries. CAROTID ARTERIES: No dissection, arterial injury, or hemodynamically significant stenosis by NASCET criteria. VERTEBRAL ARTERIES: No dissection, arterial injury, or significant stenosis. SOFT TISSUES: The lung apices are clear. No cervical or superior mediastinal lymphadenopathy. The larynx and pharynx are unremarkable. The thyroid is diffusely heterogeneous with multiple thyroid nodules measuring up to 1.8 cm. BONES: Multilevel degenerative changes in the cervical spine.  CTA HEAD: ANTERIOR CIRCULATION: No significant stenosis of the intracranial internal carotid, anterior cerebral, or middle cerebral arteries. No aneurysm. POSTERIOR CIRCULATION: No significant stenosis of the vertebral, basilar, or posterior cerebral arteries. No aneurysm. OTHER: No dural venous sinus thrombosis on this non-dedicated study. BRAIN: No mass effect or midline shift. No extra-axial fluid collection. The gray-white differentiation is maintained. Scattered mucosal thickening in the paranasal sinuses. 1. No significant stenosis or occlusion of the cervical vasculature. 2. No large vessel occlusion intracranially. 3. Heterogeneous thyroid with multiple nodules measuring up to 1.8 cm. RECOMMENDATIONS: 1.8 cm incidental thyroid nodule. Recommend thyroid US. Reference: J Am Tanvir Radiol. 2015 Feb;12(2): 143-50     MRI brain without contrast    Result Date: 7/13/2021  EXAMINATION: MRI OF THE BRAIN WITHOUT CONTRAST  7/13/2021 6:00 pm TECHNIQUE: Multiplanar multisequence MRI of the brain was performed without the administration of intravenous contrast. COMPARISON: CT head without contrast, 07/12/2021. HISTORY: ORDERING SYSTEM PROVIDED HISTORY: cva TECHNOLOGIST PROVIDED HISTORY: Reason for exam:->cva What reading provider will be dictating this exam?->CRC FINDINGS: INTRACRANIAL STRUCTURES/VENTRICLES: There is no acute infarct. No mass effect, edema or hemorrhage is seen. Mild volume loss is seen in the cerebrum with mild chronic microvascular ischemic changes. An enlarged perivascular space is seen along the left inferior basal ganglia (anatomic variant). The skull base arterial flow voids are grossly intact. No hydrocephalus or extra-axial fluid is seen. ORBITS: Prosthetic lenses are seen in the globes bilaterally. The orbits are otherwise grossly unremarkable. SINUSES: Moderate mucosal thickening in the ethmoid sinuses. Mild mucosal thickening in the remainder of the paranasal sinuses. The mastoids are grossly clear.  BONES/SOFT TISSUES: The bone marrow signal intensity appears normal. The soft tissues demonstrate no acute abnormality. 1. No acute intracranial abnormality. 2. No mass effect, edema or hemorrhage. Discharge Exam:    HEENT: NCAT,  PERRLA, No JVD  Heart:  RRR, no murmurs, gallops, or rubs. Lungs:  CTA bilaterally, no wheeze, rales or rhonchi  Abd: bowel sounds present, nontender, nondistended, no masses  Extrem:  No clubbing, cyanosis, or edema    Disposition: home     Patient Condition at Discharge: Stable    Patient Instructions:      Medication List      START taking these medications    aspirin 81 MG EC tablet  Take 1 tablet by mouth daily  Start taking on: July 15, 2021     clopidogrel 75 MG tablet  Commonly known as: PLAVIX  Take 1 tablet by mouth daily for 21 days  Start taking on: July 15, 2021        Artist Danyellerijamie taking these medications    allopurinol 300 MG tablet  Commonly known as: ZYLOPRIM     Crestor 5 MG tablet  Generic drug: rosuvastatin     doxazosin 4 MG tablet  Commonly known as: CARDURA     losartan 50 MG tablet  Commonly known as: COZAAR     tolterodine 4 MG extended release capsule  Commonly known as: DETROL LA           Where to Get Your Medications      These medications were sent to [id number], [place] - [address] - P [phone] Jesenia Dior [phone]  [address] [place], [place] [address]KADIE [address] [place] [address]    Phone: [phone]   · aspirin 81 MG EC tablet  · clopidogrel 75 MG tablet       Activity: activity as tolerated  Diet: low fat, low cholesterol diet    Pt has been advised to: Follow-up with Jen Peng DO in 1 week.   Follow-up with consultants as recommended by them    Note that over 30 minutes was spent in preparing discharge papers, discussing discharge with patient, medication review, etc.    Signed:  Estrellita Chu MD  7/14/2021  11:35 AM